# Patient Record
Sex: FEMALE | Race: WHITE | Employment: FULL TIME | URBAN - METROPOLITAN AREA
[De-identification: names, ages, dates, MRNs, and addresses within clinical notes are randomized per-mention and may not be internally consistent; named-entity substitution may affect disease eponyms.]

---

## 2019-07-21 ENCOUNTER — HOSPITAL ENCOUNTER (EMERGENCY)
Age: 29
Discharge: HOME | End: 2019-07-21
Attending: EMERGENCY MEDICINE
Payer: COMMERCIAL

## 2019-07-21 VITALS
OXYGEN SATURATION: 99 % | DIASTOLIC BLOOD PRESSURE: 63 MMHG | RESPIRATION RATE: 17 BRPM | HEIGHT: 66 IN | WEIGHT: 154.32 LBS | BODY MASS INDEX: 24.8 KG/M2 | HEART RATE: 62 BPM | SYSTOLIC BLOOD PRESSURE: 105 MMHG | TEMPERATURE: 98 F

## 2019-07-21 DIAGNOSIS — G43.909 MIGRAINE WITHOUT STATUS MIGRAINOSUS, NOT INTRACTABLE, UNSPECIFIED MIGRAINE TYPE: Primary | ICD-10-CM

## 2019-07-21 LAB
APPEARANCE UR: CLEAR
BACTERIA URNS QL MICRO: NEGATIVE /HPF
BILIRUB UR QL: NEGATIVE
COLOR UR: NORMAL
COMMENT, HOLDF: NORMAL
EPITH CASTS URNS QL MICRO: NORMAL /LPF
GLUCOSE UR STRIP.AUTO-MCNC: NEGATIVE MG/DL
HGB UR QL STRIP: NEGATIVE
HYALINE CASTS URNS QL MICRO: NORMAL /LPF (ref 0–5)
KETONES UR QL STRIP.AUTO: NEGATIVE MG/DL
LEUKOCYTE ESTERASE UR QL STRIP.AUTO: NEGATIVE
NITRITE UR QL STRIP.AUTO: NEGATIVE
PH UR STRIP: 7 [PH] (ref 5–8)
PROT UR STRIP-MCNC: NEGATIVE MG/DL
RBC #/AREA URNS HPF: NORMAL /HPF (ref 0–5)
SAMPLES BEING HELD,HOLD: NORMAL
SP GR UR REFRACTOMETRY: <1.005 (ref 1–1.03)
UR CULT HOLD, URHOLD: NORMAL
UROBILINOGEN UR QL STRIP.AUTO: 0.2 EU/DL (ref 0.2–1)
WBC URNS QL MICRO: NORMAL /HPF (ref 0–4)

## 2019-07-21 PROCEDURE — 81001 URINALYSIS AUTO W/SCOPE: CPT

## 2019-07-21 PROCEDURE — 99285 EMERGENCY DEPT VISIT HI MDM: CPT

## 2019-07-21 PROCEDURE — 96374 THER/PROPH/DIAG INJ IV PUSH: CPT

## 2019-07-21 PROCEDURE — 96375 TX/PRO/DX INJ NEW DRUG ADDON: CPT

## 2019-07-21 PROCEDURE — 96361 HYDRATE IV INFUSION ADD-ON: CPT

## 2019-07-21 PROCEDURE — 74011250636 HC RX REV CODE- 250/636: Performed by: NURSE PRACTITIONER

## 2019-07-21 PROCEDURE — 74011250636 HC RX REV CODE- 250/636: Performed by: EMERGENCY MEDICINE

## 2019-07-21 RX ORDER — MORPHINE SULFATE 2 MG/ML
4 INJECTION, SOLUTION INTRAMUSCULAR; INTRAVENOUS
Status: COMPLETED | OUTPATIENT
Start: 2019-07-21 | End: 2019-07-21

## 2019-07-21 RX ORDER — ONDANSETRON 8 MG/1
8 TABLET, ORALLY DISINTEGRATING ORAL
Qty: 10 TAB | Refills: 0 | Status: SHIPPED | OUTPATIENT
Start: 2019-07-21

## 2019-07-21 RX ORDER — ONDANSETRON 2 MG/ML
8 INJECTION INTRAMUSCULAR; INTRAVENOUS
Status: COMPLETED | OUTPATIENT
Start: 2019-07-21 | End: 2019-07-21

## 2019-07-21 RX ADMIN — ONDANSETRON 8 MG: 2 INJECTION INTRAMUSCULAR; INTRAVENOUS at 12:55

## 2019-07-21 RX ADMIN — MORPHINE SULFATE 4 MG: 2 INJECTION, SOLUTION INTRAMUSCULAR; INTRAVENOUS at 13:23

## 2019-07-21 RX ADMIN — SODIUM CHLORIDE 1000 ML: 900 INJECTION, SOLUTION INTRAVENOUS at 12:32

## 2019-07-21 NOTE — ED PROVIDER NOTES
Initial Complaint: Migraine & SOB  H/O Hemiplegia migraine on the left side. 9 weeks pregnant. Normally would take sumatriptan and Nortriptyline but cannot due to pregnancy. Started: 2-3 days ago with mild migraine. HA aborted with Tylenol. Endorses: SOB and near syncope that is not usual for her migraines. Felt like she would pass out in the shower. Pt presents in the ED c/o migraine. pt reports that she generally gets migraines and treats them with sumatriptan and Nortriptyline. Pt states she cannot take those medications now due to being 9 weeks pregnant. Pt was being treated by a neurologist in [place] [address] but does not have anyone currently managing her headaches. Pt states her migraines are hemiplegic. Pt states normally she gets blurred vision and then half of her body goes numb. This episode of her headahce was different than her usual because she had SOB and a \"heavy feeling. \" Pt states the current pain in her head is on the right temporal and parietal side. Pt further states that the pain usually either radiates into the eyes or jaws. Pt affirms headache, SOB, fatigue, light sensitivity, blurry vision, dizziness, nauseous, and numbness and tingling in the left hand. Pt denies vaginal bleeding or discharge. Pt states that 2-3 days ago she was seen by midwife in [place] at [facility]. Written as dictated by Nasima Chilel. Albert Rivers MD, by Betito Magallanes medical scribe. 1:41 PM        The history is provided by the patient and a friend. No  was used. No past medical history on file. No past surgical history on file. No family history on file.     Social History     Socioeconomic History    Marital status: Not on file     Spouse name: Not on file    Number of children: Not on file    Years of education: Not on file    Highest education level: Not on file   Occupational History    Not on file   Social Needs    Financial resource strain: Not on file   Marge Food insecurity:     Worry: Not on file     Inability: Not on file    Transportation needs:     Medical: Not on file     Non-medical: Not on file   Tobacco Use    Smoking status: Not on file   Substance and Sexual Activity    Alcohol use: Not on file    Drug use: Not on file    Sexual activity: Not on file   Lifestyle    Physical activity:     Days per week: Not on file     Minutes per session: Not on file    Stress: Not on file   Relationships    Social connections:     Talks on phone: Not on file     Gets together: Not on file     Attends Buddhism service: Not on file     Active member of club or organization: Not on file     Attends meetings of clubs or organizations: Not on file     Relationship status: Not on file    Intimate partner violence:     Fear of current or ex partner: Not on file     Emotionally abused: Not on file     Physically abused: Not on file     Forced sexual activity: Not on file   Other Topics Concern    Not on file   Social History Narrative    Not on file     ALLERGIES: Patient has no allergy information on record. Review of Systems   Constitutional: Positive for fatigue. Eyes: Positive for photophobia and visual disturbance. Respiratory: Positive for shortness of breath. Gastrointestinal: Positive for nausea. Neurological: Positive for dizziness, speech difficulty, numbness and headaches. All other systems reviewed and are negative. Vitals:    07/21/19 1211   Pulse: 82   SpO2: 100%          Physical Exam   Constitutional: She is oriented to person, place, and time. She appears well-developed and well-nourished. HENT:   Head: Normocephalic and atraumatic. Neck: Normal range of motion. Neck supple. Cardiovascular: Normal rate, regular rhythm, normal heart sounds and intact distal pulses. Pulmonary/Chest: Effort normal and breath sounds normal. No respiratory distress. She has no wheezes. She has no rales. She exhibits no tenderness. Abdominal: Soft. Bowel sounds are normal. There is no tenderness. There is no guarding. Musculoskeletal: Normal range of motion. Neurological: She is alert and oriented to person, place, and time. Slight left sided weakness. Skin: Skin is warm and dry. No erythema. Psychiatric: She has a normal mood and affect. Her behavior is normal. Judgment and thought content normal.   Nursing note and vitals reviewed. Written as dictated by Leonora Seip. Snehal Quinones MD, by Mary Awad, medical scribe. 1:41 PM      MDM       Procedures    Assessment & Plan:     Orders Placed This Encounter    URINE CULTURE HOLD SAMPLE    URINALYSIS W/MICROSCOPIC    sodium chloride 0.9 % bolus infusion 1,000 mL       Will move to core since she is having symptoms that are not usual for her presentation.      Kris Rivas NP  07/21/19  12:20 PM

## 2019-07-21 NOTE — DISCHARGE INSTRUCTIONS
Home to take your medicines as directed for nausea. Drink plenty of fluids including Pedialyte for hydration. Stay cool and no strenuous exercise. Follow-up with your OB/GYN tomorrow at [address] [place] [address] for further instructions on prophylactic treatment of these headaches.

## 2019-07-21 NOTE — ED TRIAGE NOTES
Pt arrives via wheelchair accompanied by  with c/o migraine headache onset this morning and SOB. \"I'm almost 9 weeks pregnant. While I was showering, I became tired and SOB and my head is hurting on the right side. I haven't been able to take Nortriptyline or Sumatriptan for migraines since I've been pregnant. I feel so tired and SOB. I just want to make sure the baby is alright\".

## 2020-04-17 ENCOUNTER — HOSPITAL ENCOUNTER (OUTPATIENT)
Dept: PHYSICAL THERAPY | Age: 30
Discharge: HOME | End: 2020-04-17
Payer: COMMERCIAL

## 2020-04-17 PROCEDURE — 97112 NEUROMUSCULAR REEDUCATION: CPT | Performed by: PHYSICAL THERAPIST

## 2020-04-17 PROCEDURE — 97110 THERAPEUTIC EXERCISES: CPT | Performed by: PHYSICAL THERAPIST

## 2020-04-17 PROCEDURE — 97162 PT EVAL MOD COMPLEX 30 MIN: CPT | Performed by: PHYSICAL THERAPIST

## 2020-04-17 NOTE — PROGRESS NOTES
PT INITIAL EVALUATION NOTE 2-15    Patient Name: Indra Felix  Date:2020  : 1990  [x]  Patient  Verified  Payor: BLUE CROSS / Plan: [employer] [facility] [place] / Product Type: PPO /    In time:0900  Out time:1000  Total Treatment Time (min): 60  Visit #: 1     Treatment Area: Bilateral hip pain [M25.551, M25.552]  Mixed incontinence [N39.46]    Indra Felix was informed of the inherent limitations of a virtual visit,  and has consented to a virtual therapy visit on 2020. Information regarding emergency contact information for this patient during this visit is to contact:  Agata Burr at [phone] in addition to calling 911. The patient was informed that at any time during the virtual visit, they can decide to stop the virtual visit. The patient verified that they are physically located in the [facility] for this virtual visit. SUBJECTIVE  Pain Level (0-10 scale): 5/10  Any medication changes, allergies to medications, adverse drug reactions, diagnosis change, or new procedure performed?: [] No    [x] Yes (see summary sheet for update)  Subjective:     Patient referred to pelvic PT due to signs and symptoms of UI, pelvic pain and heaviness and abdominal pain. She recently delivered her son 7 weeks ago. She reports leaking with cough, sneeze laugh along with abdominal discomfort. She reports that she saw another pelvic PT during pregnancy for diastasis. She has checked herself now and reports that she thinks it has changed from 4 fingers to 1.5 fingers at the top of her midline. Also reports having trouble initiating BM. Does not strain and is using a squatty potty but does not go until multiple attempts have happened. Her main concern is a possible prolapse, which she can visualize inside the opening of the vagina. She thinks it is at the front/bladder side.     PLOF: wnl  Mechanism of Injury: pregnancy  Previous Treatment/Compliance: saw Pelvic PT during pregnancy  PMHx/Surgical Hx:  20  Work Hx: economist  of [employer] Situation: lives with  and new born son  Pt Goals: to reduce heaviness and leaking  Barriers: none  Motivation: yes  Substance use:yes    FABQ Score: -  Cognition: A & O x 4        OBJECTIVE/EXAMINATION  Posture:  Inc kyphosis, poor postural tone secondary to postpartum 7 weeks  Other Observations:  Inc rib angle/flare  Gait and Functional Mobility:  Dec KANWLA, slow transfers  Palpation: Pt demonstrates DRAM 2 fingers at midline        MMT: patient demonstrates 3/5 TrA via telemed  Unable to stop stream mid stream    Observation: patient reports visual observation of POP at Grade 2 station        15 min Therapeutic Exercise:  [x] See flow sheet : TrA with breathing, PPT   Rationale: increase ROM, increase strength and improve coordination to improve the patients ability to perform ADLs       15 min Neuromuscular Re-education:  [x]  See flow sheet :the knack, breathing with effort, perinal splinting, bulging   Rationale: increase ROM, increase strength and increase proprioception  to improve the patients ability to perform ADLs      With   [] TE   [] TA   [] neuro   [] other: Patient Education: [x] Review HEP    [] Progressed/Changed HEP based on:   [] positioning   [] body mechanics   [] transfers   [] heat/ice application    [] other:        Other Objective/Functional Measures:see above    Pain Level (0-10 scale) post treatment: 1/10      ASSESSMENT:      [x]  See Plan of Care      Janny Bangura is a 26 y.o. female being evaluated by a Virtual Visit (video visit) encounter to address concerns as mentioned above. A caregiver was present when appropriate. Due to this being a TeleHealth encounter ([facility] public [facility] emergency), evaluation of the following areas was limited: Direct tissue palpation, direct goniometric measurements, blood pressure, O2 saturation.  Pursuant to the emergency declaration under the [facility], [address] waiver authority and the [employer] and [employer] Act, this Virtual Visit was conducted with patient's (and/or legal guardian's) consent, to reduce the risk of exposure to COVID-19 and provide necessary medical care. Services were provided through a video synchronous discussion virtually to substitute for in-person encounter. --Ximena Abreu, PT on 4/17/2020 at 9:59 AM    An electronic signature was used to authenticate this note.

## 2020-04-17 NOTE — PROGRESS NOTES
[address] [facility] [address]  Phone: [phone]  Fax: [phone]    Plan of Care/ Statement of Necessity for Physical Therapy Services 2-15    Patient name: Hai Thomas  : 1990  Provider#: [id number]  Referral source: Referred, Self, MD      Medical/Treatment Diagnosis: Bilateral hip pain [M25.551, M25.552]  Mixed incontinence [N39.46]     Prior Hospitalization: see medical history     Comorbidities: see eval  Prior Level of Function: see eval  Medications: Verified on Patient Summary List    Start of Care: 18      Onset Date: 7 weeks ago       The Plan of Care and following information is based on the information from the initial evaluation. Assessment/ key information:Patient is 26year old female referred to pelvic PT for pelvic floor dysfunction post partum 7 weeks. She presents with signs and symptoms associated with stress and urge incontinence and pelvic organ prolapse. She presents with strength deficits, overactivity, poor coordination and awareness of the levator ani. She will benefit from skilled PT to address these problems so that she can perform all ADLs at [facility]. Evaluation Complexity History MEDIUM  Complexity : 1-2 comorbidities / personal factors will impact the outcome/ POC ; Examination MEDIUM Complexity : 3 Standardized tests and measures addressing body structure, function, activity limitation and / or participation in recreation  ;Presentation MEDIUM Complexity : Evolving with changing characteristics  ; Clinical Decision Making MEDIUM Complexity : FOTO score of 26-74  Overall Complexity Rating: MEDIUM    Problem List: pain affecting function, decrease ROM, decrease strength, impaired gait/ balance, decrease ADL/ functional abilitiies, decrease activity tolerance and decrease flexibility/ joint mobility   Treatment Plan may include any combination of the following: Therapeutic exercise, Therapeutic activities, Neuromuscular re-education, Physical agent/modality, Manual therapy, Aquatic therapy, Patient education and Functional mobility training  Patient / Family readiness to learn indicated by: asking questions, trying to perform skills and interest  Persons(s) to be included in education: patient (P)  Barriers to Learning/Limitations: None  Patient Goal (s): to reduce leaking and heaviness  Patient Self Reported Health Status: good  Rehabilitation Potential: good    Short Term Goals: To be accomplished in 6 weeks:  Patient will be independent with a progressive home exercise program    Patient will demonstrate & utilized pelvic floor ms protection techniques   Patient will demonstrate improved PFM strength to 3+/5 bilaterally in order to decrease UI symptoms   Patient will be independent with urge suppression techniques, bladder irritant elmination   Patient will complete a 72 hour bladder diary for analysis  Patient will be independent with progressive body scan relaxation program     Long Term Goals: To be accomplished in 12 weeks:  Patient will demonstrate 4/5 PFM strength bilaterally in order to eliminate UI symptoms. Patient will demonstrate 10 second PFM holds at 4/5 in order to elminate UI symptoms. Patient will have no loss of urine with cough/sneeze. Patient will have no loss of urine with urge triggers (key in door, pulling pants down)  Patient will have no sensation of bulge in vagina with standing/walking. Patient will have no pain with intercourse  Frequency / Duration: Patient to be seen 1-2 times per week for 12 weeks. Patient/ Caregiver education and instruction: self care, activity modification and exercises    [x]  Plan of care has been reviewed with APRIL Kaplan, PT 4/17/2020     ________________________________________________________________________    I certify that the above Therapy Services are being furnished while the patient is under my care.  I agree with the treatment plan and certify that this therapy is necessary.     [de-identified] Signature:____________________  Date:____________Time: _________

## 2020-04-28 ENCOUNTER — HOSPITAL ENCOUNTER (OUTPATIENT)
Dept: PHYSICAL THERAPY | Age: 30
Discharge: HOME | End: 2020-04-28
Payer: COMMERCIAL

## 2020-04-28 PROCEDURE — 97112 NEUROMUSCULAR REEDUCATION: CPT | Performed by: PHYSICAL THERAPIST

## 2020-04-28 PROCEDURE — 97110 THERAPEUTIC EXERCISES: CPT | Performed by: PHYSICAL THERAPIST

## 2020-04-28 NOTE — PROGRESS NOTES
PT DAILY TREATMENT NOTE 2-15    Patient Name: Margoth Lemos  Date:2020  : 1990  [x]  Patient  Verified  Payor: BLUE CROSS / Plan: [employer] [facility] / Product Type: PPO /    In time:1000  Out time:1100  Total Treatment Time (min): 60  Visit #: 2   Treatment Area: Bilateral hip pain [M25.551, M25.552]  Mixed incontinence [N39.46]    Margoth Lemos was informed of the inherent limitations of a virtual visit,  and has consented to a virtual therapy visit on 2020.  Information regarding emergency contact information for this patient during this visit is to contact:  Noemi Skiff at [phone] in addition to calling 911.  The patient was informed that at any time during the virtual visit, they can decide to stop the virtual visit. The patient verified that they are physically located in the [facility] for this virtual visit. SUBJECTIVE  Pain Level (0-10 scale): 2/10  Any medication changes, allergies to medications, adverse drug reactions, diagnosis change, or new procedure performed?: [x] No    [] Yes (see summary sheet for update)  Subjective functional status/changes:   [] No changes reported  Patient reports that she continues to have pelvic heaviness and some leaking. It has improved about 25%. Not feeling like she is engaging her core and isnt sure if she is doing exercises correctly.   No improvement in DRAM at this time    OBJECTIVE        30 min Therapeutic Exercise:  [] See flow sheet :overflow exercises for TrA/PFM strengthing   Rationale: increase ROM, increase strength and improve coordination to improve the patients ability to perform ADLs         30 min Neuromuscular Re-education:  [x]  See flow sheet :breath coordination/bulging   Rationale: increase ROM, increase strength and improve coordination  to improve the patients ability to perform ADLs              With   [] TE   [] TA   [] neuro   [] other: Patient Education: [x] Review HEP    [] Progressed/Changed HEP based on:   [] positioning   [] body mechanics   [] transfers   [] heat/ice application    [] other:      Other Objective/Functional Measures: Demonstrates good engagement IO's and TrA during exercises and not roya curt or overuse of rectus. Pain Level (0-10 scale) post treatment: 0    ASSESSMENT/Changes in Function:   Encouraged patient to focus on exercises in supine to ensure correct technique and improved coordination of TrA/PfM and breathing. Patient will continue to benefit from skilled PT services to modify and progress therapeutic interventions, address functional mobility deficits, address ROM deficits, address strength deficits, analyze and address soft tissue restrictions, analyze and cue movement patterns, analyze and modify body mechanics/ergonomics and assess and modify postural abnormalities to attain remaining goals. []  See Plan of Care  []  See progress note/recertification  []  See Discharge Summary         Progress towards goals / Updated goals:  Demostrates good potential to meet all goals. PLAN  []  Upgrade activities as tolerated     []  Continue plan of care  []  Update interventions per flow sheet       []  Discharge due to:_  []  Other:_        Jim Mcrae is a 26 y.o. female being evaluated by a Virtual Visit (video visit) encounter to address concerns as mentioned above. A caregiver was present when appropriate. Due to this being a TeleHealth encounter (During [facility] public [facility]), evaluation of the following areas was limited: Direct tissue palpation, direct goniometric measurements, blood pressure, O2 saturation.  Pursuant to the emergency declaration under the [address] [place] [address], [address] [place] [address] authority and the [employer] and [employer] Act, this Virtual Visit was conducted with patient's (and/or legal guardian's) consent, to reduce the risk of exposure to COVID-19 and provide necessary medical care. Services were provided through a video synchronous discussion virtually to substitute for in-person encounter. --Asha Pierre PT on 4/28/2020 at 11:46 AM    An electronic signature was used to authenticate this note.

## 2020-05-05 ENCOUNTER — HOSPITAL ENCOUNTER (OUTPATIENT)
Dept: PHYSICAL THERAPY | Age: 30
Discharge: HOME | End: 2020-05-05
Payer: COMMERCIAL

## 2020-05-05 PROCEDURE — 97110 THERAPEUTIC EXERCISES: CPT | Performed by: PHYSICAL THERAPIST

## 2020-05-05 PROCEDURE — 97112 NEUROMUSCULAR REEDUCATION: CPT | Performed by: PHYSICAL THERAPIST

## 2020-05-05 NOTE — PROGRESS NOTES
PT DAILY TREATMENT NOTE 2-15    Patient Name: Janny Bangura  Date:2020  : 1990  [x]  Patient  Verified  Payor: BLUE CROSS / Plan: [employer] [facility] / Product Type: PPO /    In time:1000  Out time:1100  Total Treatment Time (min): 60  Visit #: 3  Treatment Area: Bilateral hip pain [M25.551, M25.552]  Mixed incontinence [N39.46]    Janny Bangura was informed of the inherent limitations of a virtual visit,  and has consented to a virtual therapy visit on 2020.  Information regarding emergency contact information for this patient during this visit is to contact:  Danielpedrito Slade at [phone] in addition to calling 911.  The patient was informed that at any time during the virtual visit, they can decide to stop the virtual visit. The patient verified that they are physically located in the [facility] for this virtual visit. SUBJECTIVE  Pain Level (0-10 scale): 2/10  Any medication changes, allergies to medications, adverse drug reactions, diagnosis change, or new procedure performed?: [x] No    [] Yes (see summary sheet for update)  Subjective functional status/changes:   [] No changes reported  Patient reports that her symptoms are improving each week. Only experiencing heaviness 50% of the time now and urge is also improving. She feels better control of her leakage on the way to the bathroom. Thinks her DRAM is improving and would like to start yoga, unsure of what to start with or avoid. OBJECTIVE        30 min Therapeutic Exercise:  [] See flow sheet :overflow exercises for TrA/PFM strengthing, progressed to sitting and standing, POP protection and prevention with ADLs   Rationale: increase ROM, increase strength and improve coordination to improve the patients ability to perform ADLs         30 min Neuromuscular Re-education:  [x]  See flow sheet :breath coordination/bulging, TPR to decrease overactivity, rib excursion with breath and movement.   Progression to functional ADLs with TrA and PFM engagment   Rationale: increase ROM, increase strength and improve coordination  to improve the patients ability to perform ADLs              With   [] TE   [] TA   [] neuro   [] other: Patient Education: [x] Review HEP    [] Progressed/Changed HEP based on:   [] positioning   [] body mechanics   [] transfers   [] heat/ice application    [] other:      Other Objective/Functional Measures: Improved engagement of TrA with diaphragmatic breathing, self tested DRAM: 1 finger     Pain Level (0-10 scale) post treatment: 0    ASSESSMENT/Changes in Function:   Encouraged patient to focus on exercises in supine to ensure correct technique and improved coordination of TrA/PfM and breathing. Progressed exercises into standing and sitting overflow concept for POP and DRAM protection. Patient will continue to benefit from skilled PT services to modify and progress therapeutic interventions, address functional mobility deficits, address ROM deficits, address strength deficits, analyze and address soft tissue restrictions, analyze and cue movement patterns, analyze and modify body mechanics/ergonomics and assess and modify postural abnormalities to attain remaining goals. []  See Plan of Care  []  See progress note/recertification  []  See Discharge Summary         Progress towards goals / Updated goals:  Demostrates good potential to meet all goals. PLAN  []  Upgrade activities as tolerated     [x]  Continue plan of care  []  Update interventions per flow sheet       []  Discharge due to:_  []  Other:_        Sarika Crisostomo is a 26 y.o. female being evaluated by a Virtual Visit (video visit) encounter to address concerns as mentioned above. A caregiver was present when appropriate.  Due to this being a TeleHealth encounter (During [facility] public health emergency), evaluation of the following areas was limited: Direct tissue palpation, direct goniometric measurements, blood pressure, O2 saturation. Pursuant to the emergency declaration under the [facility], [address] [place] [address] authority and the [employer] and [employer] Act, this Virtual Visit was conducted with patient's (and/or legal guardian's) consent, to reduce the risk of exposure to COVID-19 and provide necessary medical care. Services were provided through a video synchronous discussion virtually to substitute for in-person encounter. --Derek Marques PT on 5/5/2020 at 11:46 AM    An electronic signature was used to authenticate this note.

## 2020-05-12 ENCOUNTER — HOSPITAL ENCOUNTER (OUTPATIENT)
Dept: PHYSICAL THERAPY | Age: 30
Discharge: HOME | End: 2020-05-12
Payer: COMMERCIAL

## 2020-05-12 PROCEDURE — 97110 THERAPEUTIC EXERCISES: CPT | Performed by: PHYSICAL THERAPIST

## 2020-05-12 PROCEDURE — 97112 NEUROMUSCULAR REEDUCATION: CPT | Performed by: PHYSICAL THERAPIST

## 2020-05-12 NOTE — PROGRESS NOTES
PT DAILY TREATMENT NOTE 2-15    Patient Name: Jabari Fonseca  Date:2020  : 1990  [x]  Patient  Verified  Payor: BLUE CROSS / Plan: [facility] [place] / Product Type: PPO /    In time:1000  Out time:1100  Total Treatment Time (min): 60  Visit #: 3  Treatment Area: Bilateral hip pain [M25.551, M25.552]  Mixed incontinence [N39.46]    Jabari Fonseca was informed of the inherent limitations of a virtual visit,  and has consented to a virtual therapy visit on 2020.  Information regarding emergency contact information for this patient during this visit is to contact:  Bettye Ac at [phone] in addition to calling [phone].  The patient was informed that at any time during the virtual visit, they can decide to stop the virtual visit. The patient verified that they are physically located in the [facility] for this virtual visit. SUBJECTIVE  Pain Level (0-10 scale): 2/10  Any medication changes, allergies to medications, adverse drug reactions, diagnosis change, or new procedure performed?: [x] No    [] Yes (see summary sheet for update)  Subjective functional status/changes:   [] No changes reported  Patient reports that she has made some progress this week. Abdominal pumping is really helping her to have a complete BM. She feels less heaviness every week, and feels like her abdominals are finally starting to engage. She is using her her perifit about  10 minutes/day and sees some progress with her scores. Her one concern is that she is having mild leakage throughout the day without awareness or urge. Requires her to change underwear 1-3x/day.       OBJECTIVE        25 min Therapeutic Exercise:  [] See flow sheet :overflow exercises for TrA/PFM strengthing, progressed to sitting and standing, POP protection and prevention with ADLs, TrA progression   Rationale: increase ROM, increase strength and improve coordination to improve the patients ability to perform ADLs         30 min Neuromuscular Re-education:  [x]  See flow sheet :breath coordination/bulging, TPR to decrease overactivity, rib excursion with breath and movement. Progression to functional ADLs with TrA and PFM engagment   Rationale: increase ROM, increase strength and improve coordination  to improve the patients ability to perform ADLs              With   [] TE   [] TA   [] neuro   [] other: Patient Education: [x] Review HEP    [] Progressed/Changed HEP based on:   [] positioning   [] body mechanics   [] transfers   [] heat/ice application    [] other:      Other Objective/Functional Measures: Improved engagement of TrA with diaphragmatic breathing, self tested DRAM: 1 finger     Access Code to updated exercises: [email or web address]    Pain Level (0-10 scale) post treatment: 0    ASSESSMENT/Changes in Function:       Patient will continue to benefit from skilled PT services to modify and progress therapeutic interventions, address functional mobility deficits, address ROM deficits, address strength deficits, analyze and address soft tissue restrictions, analyze and cue movement patterns, analyze and modify body mechanics/ergonomics and assess and modify postural abnormalities to attain remaining goals. []  See Plan of Care  []  See progress note/recertification  []  See Discharge Summary         Progress towards goals / Updated goals:  Demostrates good potential to meet all goals. PLAN  []  Upgrade activities as tolerated     [x]  Continue plan of care  []  Update interventions per flow sheet       []  Discharge due to:_  []  Other:_        Neha Ndiaye is a 26 y.o. female being evaluated by a Virtual Visit (video visit) encounter to address concerns as mentioned above. A caregiver was present when appropriate.  Due to this being a TeleHealth encounter (During [facility]-21 public health emergency), evaluation of the following areas was limited: Direct tissue palpation, direct goniometric measurements, blood pressure, O2 saturation. Pursuant to the emergency declaration under the [facility], [address] [place] [address] authority and the [employer] Act, this Virtual Visit was conducted with patient's (and/or legal guardian's) consent, to reduce the risk of exposure to COVID-19 and provide necessary medical care. Services were provided through a video synchronous discussion virtually to substitute for in-person encounter. --Ulisses Lovell, PT on 5/12/2020 at 11:46 AM    An electronic signature was used to authenticate this note.

## 2020-05-26 ENCOUNTER — APPOINTMENT (OUTPATIENT)
Dept: PHYSICAL THERAPY | Age: 30
End: 2020-05-26
Payer: COMMERCIAL

## 2020-06-02 ENCOUNTER — HOSPITAL ENCOUNTER (OUTPATIENT)
Dept: PHYSICAL THERAPY | Age: 30
Discharge: HOME | End: 2020-06-02
Payer: COMMERCIAL

## 2020-06-02 PROCEDURE — 97110 THERAPEUTIC EXERCISES: CPT | Performed by: PHYSICAL THERAPIST

## 2020-06-02 PROCEDURE — 97112 NEUROMUSCULAR REEDUCATION: CPT | Performed by: PHYSICAL THERAPIST

## 2020-06-02 NOTE — PROGRESS NOTES
PT DAILY TREATMENT NOTE 2-15    Patient Name: Margoth Lemos  Date:2020  : 1990  [x]  Patient  Verified  Payor: BLUE CROSS / Plan: [employer] [facility] / Product Type: PPO /    In time:1030  Out time:1130  Total Treatment Time (min): 60  Visit #: 5  Treatment Area: Bilateral hip pain [M25.551, M25.552]  Mixed incontinence [N39.46]    Margoth Lemos was informed of the inherent limitations of a virtual visit,  and has consented to a virtual therapy visit on 2020.  Information regarding emergency contact information for this patient during this visit is to contact:  Noemi Skiff at [phone] in addition to calling [phone].  The patient was informed that at any time during the virtual visit, they can decide to stop the virtual visit. The patient verified that they are physically located in the [facility] for this virtual visit. SUBJECTIVE  Pain Level (0-10 scale): 2/10  Any medication changes, allergies to medications, adverse drug reactions, diagnosis change, or new procedure performed?: [x] No    [] Yes (see summary sheet for update)  Subjective functional status/changes:   [] No changes reported  Patient reports significant progress today and only experiencing urine leakage about 10% of the time. Feels much more control over initiating and stopping voids. Still presents with a 'mummy tummy' and feels like it is her connective tissue and not fat. Unsure if she is measuring DRAM correctly. However she reports feeling more control and supportive with movements and while holding her son. Still having pain moving from reclined position to seated.         OBJECTIVE        15 min Therapeutic Exercise:  [] See flow sheet :overflow exercises for TrA/PFM strengthing, progressed to sitting and standing, POP protection and prevention with ADLs, TrA progression   Rationale: increase ROM, increase strength and improve coordination to improve the patients ability to perform ADLs         30 min Neuromuscular Re-education:  [x]  See flow sheet :breath coordination/bulging, TPR to decrease overactivity, rib excursion with breath and movement. Progression to functional ADLs with TrA and PFM engagment--> overflow 6-10   Rationale: increase ROM, increase strength and improve coordination  to improve the patients ability to perform ADLs              With   [] TE   [] TA   [] neuro   [] other: Patient Education: [x] Review HEP    [] Progressed/Changed HEP based on:   [] positioning   [] body mechanics   [] transfers   [] heat/ice application    [] other:      Other Objective/Functional Measures: Progressed abdominal DRAM correction and protection exercises today. Tolerated well. Access Code to updated exercises: [email or web address]    Pain Level (0-10 scale) post treatment: 0    ASSESSMENT/Changes in Function:       Patient will continue to benefit from skilled PT services to modify and progress therapeutic interventions, address functional mobility deficits, address ROM deficits, address strength deficits, analyze and address soft tissue restrictions, analyze and cue movement patterns, analyze and modify body mechanics/ergonomics and assess and modify postural abnormalities to attain remaining goals. []  See Plan of Care  []  See progress note/recertification  []  See Discharge Summary         Progress towards goals / Updated goals:  Demostrates good potential to meet all goals. PLAN  []  Upgrade activities as tolerated     [x]  Continue plan of care  []  Update interventions per flow sheet       []  Discharge due to:_  []  Other:_        Margoth Lemos is a 26 y.o. female being evaluated by a Virtual Visit (video visit) encounter to address concerns as mentioned above. A caregiver was present when appropriate.  Due to this being a TeleHealth encounter (During [facility]TJH[facility]-03 public health emergency), evaluation of the following areas was limited: Direct tissue palpation, direct goniometric measurements, blood pressure, O2 saturation. Pursuant to the emergency declaration under the [facility], [address] [place] [address] authority and the [employer] Act, this Virtual Visit was conducted with patient's (and/or legal guardian's) consent, to reduce the risk of exposure to COVID-19 and provide necessary medical care. Services were provided through a video synchronous discussion virtually to substitute for in-person encounter. --Gordo Metcalf, PT on 6/2/2020 at 11:46 AM    An electronic signature was used to authenticate this note.

## 2020-10-01 ENCOUNTER — HOSPITAL ENCOUNTER (OUTPATIENT)
Dept: PHYSICAL THERAPY | Age: 30
Discharge: HOME | End: 2020-10-01
Payer: COMMERCIAL

## 2020-10-01 PROCEDURE — 97110 THERAPEUTIC EXERCISES: CPT | Performed by: PHYSICAL THERAPIST

## 2020-10-01 NOTE — PROGRESS NOTES
PT DAILY TREATMENT NOTE 2-15    Patient Name: Jeannine Sellers  Date:10/1/2020  : 1990  [x]  Patient  Verified  Payor: BLUE CROSS / Plan: [facility] / Product Type: PPO /    In time:1015  Out time:1100  Total Treatment Time (min): 45  Visit #: 6  Treatment Area: Bilateral hip pain [M25.551, M25.552]  Mixed incontinence [N39.46]    Jeannine Sellers was informed of the inherent limitations of a virtual visit,  and has consented to a virtual therapy visit on 2020.  Information regarding emergency contact information for this patient during this visit is to contact:  Frantz Christiansen at [phone] in addition to calling [phone].  The patient was informed that at any time during the virtual visit, they can decide to stop the virtual visit. The patient verified that they are physically located in the [facility] for this virtual visit. SUBJECTIVE  Pain Level (0-10 scale): 2/10  Any medication changes, allergies to medications, adverse drug reactions, diagnosis change, or new procedure performed?: [x] No    [] Yes (see summary sheet for update)  Subjective functional status/changes:   [] No changes reported  Patient reports that she feels like she has hit a plateau in her progress. If she takes one day off from doing exercises she feels heaviness in pelvis again. She is still having a hard time evacuating stool. It is not a consistency problem, and she braces/splints without much effect. Unable to pass gas. Continues to feel like she has a DRAM in her lower abdomen. Still Bfing her 11 month old, starting to wean and had a menstrual cycle recently.       OBJECTIVE        30 min Therapeutic Exercise:  [] See flow sheet :overflow exercises for TrA/PFM strengthing, progressed to sitting and standing, POP protection and prevention with ADLs, TrA progression   Rationale: increase ROM, increase strength and improve coordination to improve the patients ability to perform ADLs                 With [] TE   [] TA   [] neuro   [] other: Patient Education: [x] Review HEP    [x] Progressed/Changed HEP based on:   [x] positioning   [x] body mechanics   [x] transfers   [] heat/ice application    [] other:      Other Objective/Functional Measures: Difficulty viewing patient's ability to correctly perform diaphragmatic breathing with exercise. Will assess performance, rib excursion,  PFM descent and elevation in office next visit. Access Code to updated exercises: [email or web address]    Pain Level (0-10 scale) post treatment: 0    ASSESSMENT/Changes in Function:       Patient will continue to benefit from skilled PT services to modify and progress therapeutic interventions, address functional mobility deficits, address ROM deficits, address strength deficits, analyze and address soft tissue restrictions, analyze and cue movement patterns, analyze and modify body mechanics/ergonomics and assess and modify postural abnormalities to attain remaining goals. []  See Plan of Care  []  See progress note/recertification  []  See Discharge Summary         Progress towards goals / Updated goals:  Demostrates good potential to meet all goals. PLAN  []  Upgrade activities as tolerated     [x]  Continue plan of care  []  Update interventions per flow sheet       []  Discharge due to:_  []  Other:_        Fidelina Ndiaye is a 26 y.o. female being evaluated by a Virtual Visit (video visit) encounter to address concerns as mentioned above. A caregiver was present when appropriate. Due to this being a TeleHealth encounter (During [facility]-00 public health emergency), evaluation of the following areas was limited: Direct tissue palpation, direct goniometric measurements, blood pressure, O2 saturation.  Pursuant to the emergency declaration under the [facility], [facility] waiver authority and the [employer] Act, this Virtual Visit was conducted with patient's (and/or legal guardian's) consent, to reduce the risk of exposure to COVID-19 and provide necessary medical care. Services were provided through a video synchronous discussion virtually to substitute for in-person encounter. --Titus Martínez, PT on 10/1/2020 at 11:46 AM    An electronic signature was used to authenticate this note.

## 2020-10-08 ENCOUNTER — HOSPITAL ENCOUNTER (OUTPATIENT)
Dept: PHYSICAL THERAPY | Age: 30
Discharge: HOME | End: 2020-10-08
Payer: COMMERCIAL

## 2020-10-08 PROCEDURE — 97110 THERAPEUTIC EXERCISES: CPT | Performed by: PHYSICAL THERAPIST

## 2020-10-08 PROCEDURE — 97112 NEUROMUSCULAR REEDUCATION: CPT | Performed by: PHYSICAL THERAPIST

## 2020-10-08 NOTE — PROGRESS NOTES
PT DAILY TREATMENT NOTE 2-15    Patient Name: Shawna Collier  Date:10/8/2020  : 1990  [x]  Patient  Verified  Payor: BLUE CROSS / Plan: [employer] [facility] [place] / Product Type: PPO /    In time:1015  Out time:1100  Total Treatment Time (min): 45  Visit #: 7  Treatment Area: Bilateral hip pain [M25.551, M25.552]  Mixed incontinence [N39.46]        SUBJECTIVE  Pain Level (0-10 scale): 2/10  Any medication changes, allergies to medications, adverse drug reactions, diagnosis change, or new procedure performed?: [x] No    [] Yes (see summary sheet for update)  Subjective functional status/changes:   [] No changes reported  Patient reports that she feels like she has hit a plateau in her progress. If she takes one day off from doing exercises she feels heaviness in pelvis again. She is still having a hard time evacuating stool. It is not a consistency problem, and she braces/splints without much effect. Unable to pass gas. Continues to feel like she has a DRAM in her lower abdomen. Still Bfing her 11 month old, starting to wean and had a menstrual cycle recently.       OBJECTIVE        30 min Therapeutic Exercise:  [] See flow sheet :overflow exercises for TrA/PFM strengthing, progressed to sitting and standing, POP protection and prevention with ADLs, TrA progression   Rationale: increase ROM, increase strength and improve coordination to improve the patients ability to perform ADLs       15 min Neuromuscular Reeducation:  [x] See flow sheet :SEMG   Rationale: increase ROM, increase strength and improve coordination to improve the patients ability to perform ADLs                    With   [] TE   [] TA   [] neuro   [] other: Patient Education: [x] Review HEP    [x] Progressed/Changed HEP based on:   [x] positioning   [x] body mechanics   [x] transfers   [] heat/ice application    [] other:      Other Objective/Functional Measures:     External Pelvic Exam  Non tender through external pelvic clock  No POP at rest or with sustained valsalva (grade 1 anterior wall, 2 posterior wall  Active contraction: presnt  Active relaxation: presnet  Involuntary relaxation: present    Internal Vaginal Exam:  Layer 1 wnl  Layer 2/3 wnl  Bladder neck mobility:wnl    PERFECT SCORE CHART  P =  Power (Laycock Scale Grade 0-5) 4/5  E =  Endurance (How long pt holds max contraction) 2 secs  R =  Repetitions (How many times the repeats holds) 3x  F =  Fast Twitch (How many 1 second contractions in 10 seconds) unable to coordinate initially (2x)  E =  Elevation (Lift of post vaginal wall toward pubic bone) present  C =  Coordinated cocontraction of transverse abdominus presnet  T = Timing (squeeze and lift with cough) present      SEMG: normal resting tone  Max work: 25mV with early fatigue and statue of liberty curves over 5 seconds        Access Code to updated exercises: [email or web address]    Pain Level (0-10 scale) post treatment: 0    ASSESSMENT/Changes in Function:   Discuess the possibility of overtraining effects and PFM fatigue and Sx of heaviness. HEP now to focus on functional PFM with transfers, lift, push/pull. Reduced the number of kegels/day to <100. Patient will continue to benefit from skilled PT services to modify and progress therapeutic interventions, address functional mobility deficits, address ROM deficits, address strength deficits, analyze and address soft tissue restrictions, analyze and cue movement patterns, analyze and modify body mechanics/ergonomics and assess and modify postural abnormalities to attain remaining goals. []  See Plan of Care  []  See progress note/recertification  []  See Discharge Summary         Progress towards goals / Updated goals:  Demostrates good potential to meet all goals.     PLAN  []  Upgrade activities as tolerated     [x]  Continue plan of care  []  Update interventions per flow sheet       []  Discharge due to:_  []  Other:_            --Pinky Richards, PT on 10/8/2020 at 11:46 AM

## 2020-10-13 NOTE — PROGRESS NOTES
[facility]  [place]  [facility], [address]  Phone: [phone]      Fax:  [phone]    Progress Note    Name: Desean Stauffer   : 1990   MD: Muna Joya MD       Treatment Diagnosis: Bilateral hip pain [M25.551, M25.552]  Mixed incontinence [N39.46]  Start of Care: 20    Visits from Start of Care: 7  Missed Visits: 0    Summary of Care:Patient seen in clinic for first time since April, as all previous visits were performed via telehealth. She presents with difficulty coordinatied PFM and TrA with breath mechanics and functional movements, along with strength and endurance deficits. She will continue to benefit from skilled PT so that she can meet all PT goals and perform ADLs at [facility]. Assessment / Recommendations:      Short Term Goals: To be accomplished in 6 weeks:MET  Patient will be independent with a progressive home exercise program    Patient will demonstrate & utilized pelvic floor ms protection techniques   Patient will demonstrate improved PFM strength to 3+/5 bilaterally in order to decrease UI symptoms   Patient will be independent with urge suppression techniques, bladder irritant elmination   Patient will complete a 72 hour bladder diary for analysis  Patient will be independent with progressive body scan relaxation program      Long Term Goals: To be accomplished in 12 weeks: In Progress  Patient will demonstrate 4/5 PFM strength bilaterally in order to eliminate UI symptoms. Patient will demonstrate 10 second PFM holds at 4/5 in order to elminate UI symptoms. Patient will have no loss of urine with cough/sneeze. Patient will have no loss of urine with urge triggers (key in door, pulling pants down)  Patient will have no sensation of bulge in vagina with standing/walking.    Patient will have no pain with intercourse    Doroteo Garsia, PT 10/13/2020    ________________________________________________________________________  NOTE TO PHYSICIAN:  Please complete the following and fax to: [facility]: [phone]  . Retain this original for your records. If you are unable to process this request in 24 hours, please contact our office.        ____ I have read the above report and request that my patient continue therapy with the following changes/special instructions:  ____ I have read the above report and request that my patient be discharged from therapy    Physician's Signature:_________________ Date:___________Time:__________

## 2020-12-21 NOTE — PROGRESS NOTES
Physical Therapy at [facility],   a part of  [facility]  [facility]  [facility], [address] [place] [address]  Phone: [phone]  Fax: [phone]    Discharge Summary  2-15    Patient name: Humphrey Chun  : 1990  Provider#: [id number]  Referral source: Pilar Loya MD      Medical/Treatment Diagnosis: Bilateral hip pain [M25.551, M25.552]  Mixed incontinence [N39.46]     Prior Hospitalization: see medical history     Comorbidities: See Plan of Care  Prior Level of Function:See Plan of Care  Medications: Verified on Patient Summary List    Start of Care: 20      Onset Date:2 months   Visits from Start of Care: 8     Missed Visits: 0  Reporting Period : 20 to 10/08/20      ASSESSMENT/SUMMARY OF CARE: Patient seen for 8 visits (1 in person) for POP and DRAM s/p  2020. She has made excellent progress towards all goals. Due to COVID-19 precautions she has not returned to [facility] and unable to assess all goals at this time.           RECOMMENDATIONS:  [x]Discontinue therapy: [x]Patient has reached or is progressing toward set goals      []Patient is non-compliant or has abdicated      []Due to lack of appreciable progress towards set goals      []Other    Devyn Gusman, PT 2020

## 2021-07-05 ENCOUNTER — HOSPITAL ENCOUNTER (OUTPATIENT)
Dept: PHYSICAL THERAPY | Age: 31
Discharge: HOME | End: 2021-07-05
Payer: COMMERCIAL

## 2021-07-05 PROCEDURE — 97162 PT EVAL MOD COMPLEX 30 MIN: CPT | Performed by: PHYSICAL THERAPIST

## 2021-07-05 PROCEDURE — 97110 THERAPEUTIC EXERCISES: CPT | Performed by: PHYSICAL THERAPIST

## 2021-07-05 NOTE — PROGRESS NOTES
Physical Therapy at [facility],   a part of  [facility]  [place]  [facility] Lisette Benson  Phone: [phone]  Fax: [phone]    Plan of Care/ Statement of Necessity for Physical Therapy Services 2-15    Patient name: Thony Wheat  : 1990  Provider#: [id number]  Referral source: Alison Lord MD      Medical/Treatment Diagnosis: Pelvic floor dysfunction [M62.89]     Prior Hospitalization: see medical history     Comorbidities: n/a  Prior Level of Function: see eval  Medications: Verified on Patient Summary List    Start of Care: 2021      Onset Date: 1 week ago       The Plan of Care and following information is based on the information from the initial evaluation. Assessment/ key information: Patient referred to pelvic PT for pelvic floor dysfunction secondary to pregnancy. She presents with signs and symptoms associated with stress and urge incontinence. She presents with strength deficits, overactivity, poor coordination and awareness of the levator ani. She will benefit from skilled PT to address these problems so that she can perform all ADLs at [facility]. Evaluation Complexity History MEDIUM  Complexity : 1-2 comorbidities / personal factors will impact the outcome/ POC ; Examination MEDIUM Complexity : 3 Standardized tests and measures addressing body structure, function, activity limitation and / or participation in recreation  ;Presentation MEDIUM Complexity : Evolving with changing characteristics  ; Clinical Decision Making MEDIUM Complexity : FOTO score of 26-74  Overall Complexity Rating: MEDIUM    Problem List: pain affecting function, decrease ROM, decrease strength, impaired gait/ balance, decrease ADL/ functional abilitiies, decrease activity tolerance and decrease flexibility/ joint mobility   Treatment Plan may include any combination of the following: Therapeutic exercise, Therapeutic activities, Neuromuscular re-education, Physical agent/modality, Manual therapy, Patient education, Self Care training and Functional mobility training  Patient / Family readiness to learn indicated by: asking questions, trying to perform skills and interest  Persons(s) to be included in education: patient (P)  Barriers to Learning/Limitations: None  Patient Goal (s): to reduce UI and POP risk  Patient Self Reported Health Status: good  Rehabilitation Potential: good    Short Term Goals: To be accomplished in 6 weeks:  Patient will be independent with a progressive home exercise program    Patient will demonstrate & utilized pelvic floor ms protection techniques   Patient will demonstrate improved PFM strength to 3+/5 bilaterally in order to decrease UI symptoms   Patient will be independent with urge suppression techniques, bladder irritant elmination   Patient will complete a 72 hour bladder diary for analysis    Long Term Goals: To be accomplished in 12 weeks:  Patient will demonstrate 4/5 PFM strength bilaterally in order to eliminate UI symptoms. Patient will demonstrate 10 second PFM holds at 4/5 in order to elminate UI symptoms. Patient will have no loss of urine with cough/sneeze. Patient will have no loss of urine with urge triggers (key in door, pulling pants down)  Patient will have no sensation of bulge in vagina with standing/walking. Frequency / Duration: Patient to be seen 1-2  times per week for 12 weeks. Patient/ Caregiver education and instruction: self care, activity modification and exercises    [x]  Plan of care has been reviewed with APRIL Cottrell, PT 7/5/2021     ________________________________________________________________________    I certify that the above Therapy Services are being furnished while the patient is under my care. I agree with the treatment plan and certify that this therapy is necessary.     [de-identified] Signature:____________________  Date:____________Time: _________      Elza De La Cruz MD

## 2021-07-05 NOTE — PROGRESS NOTES
PT INITIAL EVALUATION NOTE 2-15    Patient Name: Florence Whyte  Date:2021  : 1990  [x]  Patient  Verified  Payor: BLUE CROSS / Plan: [employer] [facility] [place] / Product Type: PPO /    In time:145  Out time:024  Total Treatment Time (min): 60  Visit #: 1     Treatment Area: Pelvic floor dysfunction [M62.89]    SUBJECTIVE  Pain Level (0-10 scale): 0  Any medication changes, allergies to medications, adverse drug reactions, diagnosis change, or new procedure performed?: [] No    [x] Yes (see summary sheet for update)  Subjective:     Patient reports that she is newly pregnant (5 weeks). She is scheduled for her first prenatal appointment in 2 weeks. She has mild symptoms of core instability and pelvic heaviness. Still splinting to perform BM. Still Bfing 16 mo. Mostly she would like to review her exercises for PFM and core to make sure she is doing them correctly and that they are safe for pregnancy.   PLOF: exercise with    Mechanism of Injury: pregnancy  Previous Treatment/Compliance: pelvic PT postpartum 16 months ago  PMHx/Surgical Hx: n/a  Work Hx: full time   Living Situation: lives with  and son  Pt Goals: to reduce POP risk and UI  Barriers: none  Motivation: yes  Substance use: none   Cognition: A & O x 4        OBJECTIVE/EXAMINATION  Posture:  Mild kyphosis with PEC patterning  +ADT loretta  +HGIR loretta  Functional Squat: level 2  -EDT loretta  Other Observations:  Poor curve reversal with AROM  Gait and Functional Mobility:  Dec trunk rotation  Palpation: DRAM         Lumbar AROM:  Cervical AROM:        R  L  R  L  Flexion    75        Extension   50        Side Bending   75  75      Rotation               LOWER QUARTER   MUSCLE STRENGTH  KEY       R  L  0 - No Contraction  L1, L2 Psoas  4  4  1 - Trace   L3 Quads  5  5  2 - Poor   L4 Tib Ant  5  5  3 - Fair    L5 EHL  5  5  4 - Good   S1 FHL  5  5  5 - Normal   S2 Hams  5  5              MMT: TrA: 4/5  Neurological: Reflexes / Sensations: wnl  Special Tests:    Trendelenberg: -    Stork: -   Forward Bend: -    Slump: -   Cooper: -     Justin: -   H.S. SLR: -     Piriformis Ext: -   Long Sit: -     Other: -   Compression/Distraction: -          30 min Therapeutic Exercise:  [x] See flow sheet :   Rationale: increase ROM, increase strength, improve coordination and increase proprioception to improve the patients ability to perform ADLs without pain or UI      With   [] TE   [] TA   [] Neuro   [] SC   [] other: Patient Education: [x] Review HEP    [] Progressed/Changed HEP based on:   [] positioning   [] body mechanics   [] transfers   [] heat/ice application    [] other:        Other Objective/Functional Measures: FOTO Functional Measure: 84/100                  Pain Level (0-10 scale) post treatment: 0      ASSESSMENT:      [x]  See Plan of [facility], PT 7/5/2021

## 2021-08-05 ENCOUNTER — APPOINTMENT (OUTPATIENT)
Dept: PHYSICAL THERAPY | Age: 31
End: 2021-08-05

## 2021-11-23 NOTE — PROGRESS NOTES
Physical Therapy at [facility],   a part of  [facility]  [address]  Phone: [phone]  Fax: [phone]    Discharge Summary  2-15    Patient name: Arabella Saleem  : 1990  Provider#: [id number]  Referral source: Manuel Woods MD      Medical/Treatment Diagnosis: Pelvic floor dysfunction [M62.89]     Prior Hospitalization: see medical history     Comorbidities: See Plan of Care  Prior Level of Function:See Plan of Care  Medications: Verified on Patient Summary List    Start of Care: 2021      Onset Date:3 months   Visits from Start of Care: 1     Missed Visits: 0  Reporting Period : 2021       ASSESSMENT/SUMMARY OF CARE: Patient did not return for follow up, therefore unable to assess goals at this time. Provided with HEP for ongoing symptom management.            RECOMMENDATIONS:  [x]Discontinue therapy: []Patient has reached or is progressing toward set goals      [x]Patient is non-compliant or has abdicated      []Due to lack of appreciable progress towards set goals      []Other    Melina Jenkins, PT 2021

## 2024-11-04 ENCOUNTER — HOSPITAL ENCOUNTER (EMERGENCY)
Facility: HOSPITAL | Age: 34
Discharge: HOME | End: 2024-11-04
Attending: EMERGENCY MEDICINE
Payer: COMMERCIAL

## 2024-11-04 VITALS
RESPIRATION RATE: 18 BRPM | WEIGHT: 158.73 LBS | SYSTOLIC BLOOD PRESSURE: 108 MMHG | TEMPERATURE: 98.7 F | DIASTOLIC BLOOD PRESSURE: 78 MMHG | HEART RATE: 69 BPM | OXYGEN SATURATION: 98 %

## 2024-11-04 DIAGNOSIS — H66.002 ACUTE SUPPURATIVE OTITIS MEDIA OF LEFT EAR WITHOUT SPONTANEOUS RUPTURE OF TYMPANIC MEMBRANE, RECURRENCE NOT SPECIFIED: Primary | ICD-10-CM

## 2024-11-04 PROCEDURE — 99283 EMERGENCY DEPT VISIT LOW MDM: CPT

## 2024-11-04 RX ORDER — OFLOXACIN 3 MG/ML
10 SOLUTION AURICULAR (OTIC) DAILY
COMMUNITY
Start: 2024-11-02 | End: 2024-11-09

## 2024-11-04 RX ORDER — AZITHROMYCIN 250 MG/1
TABLET, FILM COATED ORAL
Qty: 1 PACKET | Refills: 0 | Status: SHIPPED | OUTPATIENT
Start: 2024-11-04 | End: 2024-11-08

## 2024-11-05 NOTE — DISCHARGE INSTRUCTIONS
Please continue your current antibiotics.  We are adding second antibiotic to treat resistant bacteria.  Please follow-up with Dr. Barrett, otolaryngologist.

## 2024-11-05 NOTE — ED TRIAGE NOTES
Pt reports seen at urgent on Saturday at urgent care and diagnosed with ear infection. Seen there again today because symptoms are not improving and told to go to the ER. Currently taking Augmentin and ofloxacin ear drops.

## 2024-11-05 NOTE — ED PROVIDER NOTES
HENT:      Ears:      Comments: Right TM is normal.  Left TM is erythematous with some bulging, there is some swelling of the canal without overt purulence.  Cardiovascular:      Rate and Rhythm: Normal rate.   Neurological:      Mental Status: She is alert.             EMERGENCY DEPARTMENT COURSE and DIFFERENTIAL DIAGNOSIS/MDM:   Vitals:    Vitals:    11/04/24 2027   BP: 108/78   Pulse: 69   Resp: 18   Temp: 98.7 °F (37.1 °C)   TempSrc: Oral   SpO2: 98%   Weight: 72 kg (158 lb 11.7 oz)         Medical Decision Making  34-year-old female presents to the emergency department as above with clinical evidence of left otitis media without evidence of mastoiditis.  She is not septic.  She has been started on Augmentin and has been on the same for several days without improvement.  She is also on ofloxacin drops.  He is not be appropriate initial treatment but given her apparent failure to improve I will add azithromycin.  I would continue the current antibiotics for now as well.  She will not require admission or IV antibiotics.  Recommend follow-up with ENT.    Amount and/or Complexity of Data Reviewed  External Data Reviewed: notes.            REASSESSMENT          CONSULTS:  None    PROCEDURES:     Procedures            (Please note that portions of this note were completed with a voice recognition program.  Efforts were made to edit the dictations but occasionally words are mis-transcribed.)    Ranjith Olson MD (electronically signed)  Emergency Attending Physician              Ranjith Olson MD  11/04/24 2035